# Patient Record
Sex: MALE | Race: OTHER | HISPANIC OR LATINO | URBAN - METROPOLITAN AREA
[De-identification: names, ages, dates, MRNs, and addresses within clinical notes are randomized per-mention and may not be internally consistent; named-entity substitution may affect disease eponyms.]

---

## 2019-11-05 ENCOUNTER — EMERGENCY (EMERGENCY)
Facility: HOSPITAL | Age: 34
LOS: 1 days | Discharge: HOME | End: 2019-11-05
Admitting: EMERGENCY MEDICINE
Payer: SELF-PAY

## 2019-11-05 VITALS
WEIGHT: 184.97 LBS | TEMPERATURE: 99 F | OXYGEN SATURATION: 98 % | HEART RATE: 71 BPM | HEIGHT: 62 IN | SYSTOLIC BLOOD PRESSURE: 132 MMHG | DIASTOLIC BLOOD PRESSURE: 91 MMHG | RESPIRATION RATE: 16 BRPM

## 2019-11-05 PROCEDURE — 99283 EMERGENCY DEPT VISIT LOW MDM: CPT

## 2019-11-05 RX ORDER — OFLOXACIN 0.3 %
1 DROPS OPHTHALMIC (EYE)
Qty: 1 | Refills: 0
Start: 2019-11-05 | End: 2019-11-11

## 2019-11-05 NOTE — ED PROVIDER NOTE - EYES, MLM
B/L pupils equal, round and reactive to light. MILD ERYTHEMA WITH MILD UPPER LID SWELLING WITH EOM INTACT, NO ABSCESS NOTED

## 2019-11-05 NOTE — ED PROVIDER NOTE - NSFOLLOWUPCLINICS_GEN_ALL_ED_FT
[facility]  Ophthalmology  [address] [place] [address]  Phone: [phone]  Fax:   Follow Up Time: 1-3 Days

## 2019-11-05 NOTE — ED ADULT NURSE NOTE - OBJECTIVE STATEMENT
Pt presents to ED c/o R eye burning, redness, and itching since this morning, pt reports he woke up with crusting around eye. No fevers/chills, no blurry vision, no throat pain. Pt presents in NAD speaking full sentences ambulatory through triage.

## 2019-11-05 NOTE — ED PROVIDER NOTE - OBJECTIVE STATEMENT
33 y/o male with no PMHx is here with right eye pruritis x1 day. He describes a slight heavy sensation with yellowish discharge and irritation with mild discomfort to the right eye. He denies the following: fever, chills, blurred vision, double vision, HA, dizziness, confusion and no recent illness or sick contacts/travel.

## 2019-11-05 NOTE — ED PROVIDER NOTE - PATIENT PORTAL LINK FT
You can access the FollowMyHealth Patient Portal offered by [facility] by registering at the following website: [email or web address]. By joining [employer]’s FollowMyHealth portal, you will also be able to view your health information using other applications (apps) compatible with our system.

## 2019-11-05 NOTE — ED PROVIDER NOTE - CLINICAL SUMMARY MEDICAL DECISION MAKING FREE TEXT BOX
35 y/o male with mild erythema, discharge and itching to the right eye without visual disturbances. No injury/allergies/fb sensation. Likely bacterial conjunctivitis and not chemical or viral. Do not suspect uveitis, no hyphema noted. Advised opthalmology follow up in 1-3 days.

## 2019-11-05 NOTE — ED PROVIDER NOTE - NSFOLLOWUPINSTRUCTIONS_ED_ALL_ED_FT
Conjuntivitis bacteriana en los adultos  Bacterial Conjunctivitis, Adult  La conjuntivitis bacteriana es marty infección de la membrana transparente que cubre la parte shannan del nery y la tasha interna del párpado (conjuntiva). Cuando los vasos sanguíneos de la conjuntiva se inflaman, el nery se pone rodriguez o gladis, y es posible que le pique. La conjuntivitis bacteriana se transmite fácilmente de marty persona a la otra (es contagiosa). También se contagia fácilmente de un nery al otro.  ¿Cuáles son las causas?  Esta afección está causada por bacterias. Puede contraer la infección si entra en contacto estrecho con:  Marty persona que está infectada por la bacteria.Elementos que están contaminados con la bacteria, alber marty toalla para la tasha, solución para lentes de contacto o maquillaje para ojos.¿Qué incrementa el riesgo?  Es más probable que tenga esta afección si:  Mantiene contacto físico con personas que tienen la infección.Usa lentes de contacto.Tiene sinusitis.Ha tenido marty lesión o cirugía reciente en el nery.Tiene debilitado el sistema de defensa del organismo (sistema inmunitario).Tiene marty afección médica que causa sequedad en los ojos.¿Cuáles son los signos o los síntomas?  ImageLos síntomas de esta afección incluyen:  Secreción espesa y amarillenta del nery. Esta secreción puede convertirse en marty costra en el párpado brigid la noche, que hace que los párpados se peguen.Lagrimeo u ojos llorosos.Picazón en los ojos.Sensación de ardor en los ojos.Ojos rojos.Hinchazón de los párpados.Visión borrosa.¿Cómo se diagnostica?  Esta afección se diagnostica en función de los síntomas y los antecedentes médicos. El médico también puede obtener marty muestra de la secreción del nery para averiguar la causa de la infección. [place] no se hace con frecuencia.  ¿Cómo se trata?  ImageEl tratamiento de esta afección puede incluir:  Gotas o ungüento para los ojos con antibiótico para erradicar la infección con más rapidez y evitar el contagio a otras personas.Antibióticos por vía oral para tratar infecciones que no responden a las gotas o los ungüentos, o que diggs más de 10 días.Paños húmedos fríos (compresas frías) sobre los ojos.Lágrimas artificiales aplicadas 2 a 6 veces por día.Siga estas indicaciones en ramos casa:  Medicamentos     [place] los antibióticos o aplíqueselos alber se lo haya indicado el médico. No deje de alexis los antibióticos o de aplicárselos aunque comience a sentirse mejor.[place] o aplíquese los medicamentos de venta luis daniel y los recetados solamente alber se lo haya indicado el médico.Tenga mucho cuidado de no tocar el borde del párpado con el frasco de las gotas para los ojos o el tubo del ungüento cuando aplica los medicamentos en el nery afectado. [place] evitará que se contagie la infección al otro nery o a otras personas.Control de las molestias     [place] suavemente la secreción de los ojos con un paño tibio y húmedo o con marty torunda de algodón.Aplíquese marty compresa fría y limpia en el nery brigid 10 a 20 minutos, 3 o 4 veces al día.Indicaciones generales     No use lentes de contacto hasta que haya desaparecido la inflamación y ramos médico le indique que es seguro usarlos nuevamente. Pregúntele al médico cómo esterilizar o reemplazar mary ann lentes de contacto antes de usarlos nuevamente. Use anteojos hasta que pueda volver a usar los lentes de contacto.Evite usar maquillaje en los ojos hasta que la inflamación se haya coleman. Descarte cosméticos viejos para los ojos que puedan estar contaminados.Cambie o lave ramos almohada todos los emir.No comparta las toallas o los paños. [place] puede propagar la infección.Lávese las lawrence frecuentemente con agua y jabón. Use toallas de papel para secarse las lawrence.Evite tocarse o frotarse los ojos.No conduzca ni use maquinaria pesada si ramos visión es borrosa.Comuníquese con un médico si:  Tiene fiebre.Los síntomas no mejoran después de 10 días de tratamiento.Solicite ayuda inmediatamente si tiene:  Fiebre y los síntomas empeoran repentinamente.Dolor intenso cuando mueve el nery.Dolor, enrojecimiento o hinchazón en la tasha.Pérdida repentina de la visión.Resumen  La conjuntivitis bacteriana es marty infección de la membrana transparente que cubre la parte shannan del nery y la tasha interna del párpado (conjuntiva).La conjuntivitis bacteriana se transmite fácilmente de marty persona a la otra (es contagiosa).Lávese las lawrence frecuentemente con agua y jabón. Use toallas de papel para secarse las lawrence.[place] los antibióticos o aplíqueselos alber se lo haya indicado el médico. No deje de alexis los antibióticos o de aplicárselos aunque comience a sentirse mejor.Comuníquese con un médico si tiene fiebre o si los síntomas no mejoran después de 10 días.Esta información no tiene alber fin reemplazar el consejo del médico. Asegúrese de hacerle al médico cualquier pregunta que tenga.    Document Released: 09/27/2006 Document Revised: 08/21/2019 Document Reviewed: 08/21/2019  Elsevier Interactive Patient Education © 2019 Elsevier Inc.

## 2019-11-09 DIAGNOSIS — H10.9 UNSPECIFIED CONJUNCTIVITIS: ICD-10-CM

## 2019-11-09 DIAGNOSIS — H57.11 OCULAR PAIN, RIGHT EYE: ICD-10-CM

## 2019-12-17 NOTE — ED PROVIDER NOTE - PRINCIPAL DIAGNOSIS
Detail Level: Simple Render Note In Bullet Format When Appropriate: No Post-Care Instructions: I reviewed with the patient in detail post-care instructions. Patient is to wear sunprotection, and avoid picking at any of the treated lesions. Pt may apply Vaseline to crusted or scabbing areas. Number Of Freeze-Thaw Cycles: 1 freeze-thaw cycle Duration Of Freeze Thaw-Cycle (Seconds): 10 Render Post-Care Instructions In Note?: yes Consent: The patient's verbal consent was obtained including but not limited to risks of crusting, scabbing, blistering, scarring, darker or lighter pigmentary change, recurrence, incomplete removal and infection. Conjunctivitis

## 2020-03-25 ENCOUNTER — EMERGENCY (EMERGENCY)
Facility: HOSPITAL | Age: 35
LOS: 1 days | Discharge: HOME | End: 2020-03-25
Admitting: EMERGENCY MEDICINE
Payer: MEDICAID

## 2020-03-25 VITALS
OXYGEN SATURATION: 96 % | RESPIRATION RATE: 18 BRPM | HEART RATE: 104 BPM | SYSTOLIC BLOOD PRESSURE: 152 MMHG | DIASTOLIC BLOOD PRESSURE: 75 MMHG

## 2020-03-25 VITALS — TEMPERATURE: 101 F

## 2020-03-25 DIAGNOSIS — R50.9 FEVER, UNSPECIFIED: ICD-10-CM

## 2020-03-25 DIAGNOSIS — B34.9 VIRAL INFECTION, UNSPECIFIED: ICD-10-CM

## 2020-03-25 PROCEDURE — 87635 SARS-COV-2 COVID-19 AMP PRB: CPT

## 2020-03-25 PROCEDURE — 99283 EMERGENCY DEPT VISIT LOW MDM: CPT

## 2020-03-25 PROCEDURE — 99284 EMERGENCY DEPT VISIT MOD MDM: CPT

## 2020-03-25 RX ORDER — ACETAMINOPHEN 500 MG
975 TABLET ORAL ONCE
Refills: 0 | Status: COMPLETED | OUTPATIENT
Start: 2020-03-25 | End: 2020-03-25

## 2020-03-25 RX ADMIN — Medication 975 MILLIGRAM(S): at 16:25

## 2020-03-25 NOTE — ED PROVIDER NOTE - NSFOLLOWUPINSTRUCTIONS_ED_ALL_ED_FT
Your Respiratory Viral Panel is pending which tests for other viruses. You have been tested for COVID-19. The results will take up to 1 week to come back. You has been given education on Novel Coronavirus and testing education. Please self-quarantine for 14 days and follow strict guidelines. Please return to the ED for any concerning or worsening of your symptoms including shortness of breath or chest pain.    Síndrome viral    LO QUE NECESITA SABER:    El síndrome viral es un término usado para los síntomas de marty infección causada por un virus. Los virus son propagados fácilmente de marty persona a otra a través del aire y mediante los objetos que se comparten. Marty enfermedad provocada por un virus por lo general desaparece en 10 a 14 días sin tratamiento. Para marty infección viral, no se administran antibióticos.    INSTRUCCIONES SOBRE EL [facility]A HOSPITALARIA:    Llame al 911 en marni de presentar lo siguiente:    Usted sufre marty convulsión.      No es posible despertarlo.      Usted tiene dolor torácico y dificultad para respirar.    Regrese a la rosibel de emergencias si:    Usted tiene rigidez en el cornelius, dolor de any intenso y sensibilidad a la darwin.      Usted se siente débil, mareado o confundido.      Usted selina de orinar u orina menos de lo normal.      Usted tose diaz o marty mucosidad espesa amarilla o raeann.      Usted tiene dolor abdominal severo o ramos abdomen está más mukund de lo habitual.    Comuníquese con ramos médico si:    Maribel síntomas no mejoran con el tratamiento o empeoran después de 3 días.      Usted tiene salpullido o dolor de oído.      Usted siente ardor al orinar.      Usted tiene preguntas o inquietudes acerca de ramos condición o cuidado.    Medicamentos:Es posible que usted necesite alguno de los siguientes:     Acetaminofénalivia el dolor y baja la fiebre. Está disponible sin receta médica. Pregunte cuánto medicamento debe alexis y con qué frecuencia. Siga las indicaciones. El acetaminofén puede causar daño en el hígado cuando no se scott de forma correcta.      Los SARAH,alber el ibuprofeno, ayudan a disminuir la inflamación, el dolor y la fiebre. Los SARAH pueden causar sangrado estomacal o problemas renales en ciertas personas. Si usted scott un medicamento anticoagulante, siempre pregúntele a ramos médico si los SARAH son seguros para usted. Siempre jason la etiqueta de tomas medicamento y siga las instrucciones.      El medicamento para el resfriadoayuda a disminuir la inflamación, a controlar la tos y a aliviar la congestión del pecho o nasal.      El rociador nasal de agua salinaayuda a disminuir la congestión nasal.      [place] maribel medicamentos alber se le haya indicado.Consulte con ramos médico si usted lukas que ramos medicamento no le está ayudando o si presenta efectos secundarios. Infórmele si es alérgico a algún medicamento. Mantenga marty lista actualizada de los medicamentos, las vitaminas y los productos herbales que scott. Incluya los siguientes datos de los medicamentos: cantidad, frecuencia y motivo de administración. Traiga con usted la lista o los envases de las píldoras a maribel citas de seguimiento. Lleve la lista de los medicamentos con usted en marni de marty emergencia.    El manejo de maribel síntomas:    Consuma líquidos según le indicaronpara evitar la deshidratación. Pregunte cuánto líquido debe alexis cada día y cuáles líquidos son los más adecuados para usted. Pregunte si usted debería alexis marty solución de rehidratación oral (SRO). Marty SRO tiene las cantidades exactas de agua, sal y azúcar que usted necesita para reemplazar los líquidos corporales. [place] podría ayudarlo a evitar la deshidratación a causa del vómito y de la diarrea. No tome líquidos con cafeína. Los líquidos con cafeína pueden empeorar la deshidratación.      Descanse lo suficientepara ayudar a que ramos cuerpo sane. [place] siestas brigid el día. Pregunte a ramos médico cuándo puede regresar a ramos trabajo y a maribel actividades cotidianas.      Use un humidificador de krish fríopara ayudarlo a respirar más fácilmente si usted tiene congestión nasal o del pecho. Pregunte a ramos médico cómo usar un humidificador de vapor frío.      Coma miel o use caramelos para la tospara ayudar a disminuir la molestia de la garganta. Pregunte a ramos médico cuánta miel debería comer cada día. Los caramelos para la tos están disponibles sin receta médica. Siga las indicaciones para alexis los caramelos para la tos.      No fume y permanezca lejos de otras personas que fuman.La nicotina y otras sustancias químicas que contienen los cigarrillos y cigarros pueden dañar los pulmones. El fumar también puede retrasar la sanación. Pida información a ramos médico si usted actualmente fuma y necesita ayuda para dejar de fumar. Los cigarrillos electrónicos o el tabaco sin humo igualmente contienen nicotina. Consulte con ramos médico antes de utilizar estos productos.      Lávese las lawrence frecuentementepara evitar la propagación de gérmenes a otras personas. Utilice agua y jabón. Use gel antibacterial cuando no tenga jabón ni agua disponibles. Lávese las lawrence después de usar el baño, toser o estornudar. Lávese las lawrence antes de comer o preparar alimentos.    Acuda a maribel consultas de control con ramos médico según le indicaron.[place] maribel preguntas para que se acuerde de hacerlas brigid maribel visitas.

## 2020-03-25 NOTE — ED PROVIDER NOTE - OBJECTIVE STATEMENT
35 y/o M with no PMHx presents to the ED c/o fever, chills, and body aches since yesterday; denies cough, chest pain SOB, nausea, vomiting, diarrhea sore throat, or runny nose. No recent travel, no sick contacts, and no direct contact with + COVID-19 cases. 35 y/o M with no PMHx presents to the ED c/o fever, chills, headache and body aches since yesterday; denies cough, chest pain SOB, nausea, vomiting, diarrhea sore throat, or runny nose. No recent travel, no sick contacts, and no direct contact with + COVID-19 cases. 35 y/o M with no PMHx presents to the ED c/o fever, chills, headache and body aches since yesterday; denies cough, chest pain, SOB, nausea, vomiting, diarrhea, sore throat, or runny nose. No recent travel, no sick contacts, and no direct contact with + COVID-19 cases.

## 2020-03-25 NOTE — ED PROVIDER NOTE - PATIENT PORTAL LINK FT
You can access the FollowMyHealth Patient Portal offered by [facility] by registering at the following website: [email or web address]. By joining [employer]’s FollowMyHealth portal, you will also be able to view your health information using other applications (apps) compatible with our system.

## 2020-03-25 NOTE — ED PROVIDER NOTE - PHYSICAL EXAMINATION
CONSTITUTIONAL: Well-appearing; well-nourished; in no apparent distress.   HEAD: Normocephalic; atraumatic.   EYES: PERRL; EOM intact; conjunctiva and sclera clear  ENT: normal nose; external ears normal  NECK: Supple;   CARDIOVASCULAR: Normal S1, S2;  Regular rate and rhythm.   RESPIRATORY: Breathing easily; breath sounds clear and equal bilaterally; no wheezes, rhonchi, or rales.  GI: soft, nt, nd   MSK: FROM at all extremities  SKIN: Normal for age and race  NEURO: A & O x 3  PSYCHOLOGICAL: The patient’s mood and manner are appropriate.

## 2020-03-25 NOTE — ED PROVIDER NOTE - CLINICAL SUMMARY MEDICAL DECISION MAKING FREE TEXT BOX
33 y/o M with no PMHx presents to the ED c/o fever, chills, headache and body aches since yesterday. In the ER, pt with temp 100.9F, , O2Sat at 96% on RA. Pt well appearing, lungs clear. Will test for COVID-19, advised 14 days of isolation and strict return precautions given. 35 y/o M with no PMHx presents to the ED c/o fever, chills, headache and body aches since yesterday. In the ER, pt with temp 100.9F, , O2Sat at 96% on RA. Pt well appearing, lungs clear. Will test for COVID-19, advised 14 days of isolation and strict return precautions discussed.

## 2020-03-25 NOTE — ED PROVIDER NOTE - NS ED ROS FT
Constitutional: + fever, + chills  MSK: + body aches  Neuro: + headache     All other ROS neg except as per HPI

## 2020-03-25 NOTE — ED ADULT NURSE NOTE - NSIMPLEMENTINTERV_GEN_ALL_ED
Implemented All Universal Safety Interventions:  [place] to call system. Call bell, personal items and telephone within reach. Instruct patient to call for assistance. Room bathroom lighting operational. Non-slip footwear when patient is off stretcher. Physically safe environment: no spills, clutter or unnecessary equipment. Stretcher in lowest position, wheels locked, appropriate side rails in place.

## 2020-03-27 LAB — SARS-COV-2 RNA SPEC QL NAA+PROBE: SIGNIFICANT CHANGE UP

## 2021-06-17 NOTE — ED PROVIDER NOTE - MDM ORDERS SUBMITTED SELECTION
Phone Call:    D) Pt. called this date and left message re: missed group session on 4/27/21 reason illness. Call back was made this date, Pt.  explained she is sick, reporting her son was exposed to COVID-19 at school and now they are both at home ill. She reports plans to get tested today and to return to group sessions on 5/4/21 to complete the program.    Staff Name and Title:   MONIQUE Levin  Date:  4/28/2021  Time:  10:20 AM     Labs/Medications

## 2021-07-14 NOTE — ED ADULT NURSE NOTE - NSFALLRSKASSESSTYPE_ED_ALL_ED
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

## 2021-08-02 ENCOUNTER — EMERGENCY (EMERGENCY)
Facility: HOSPITAL | Age: 36
LOS: 1 days | Discharge: HOME | End: 2021-08-02
Attending: EMERGENCY MEDICINE | Admitting: EMERGENCY MEDICINE
Payer: MEDICAID

## 2021-08-02 VITALS
HEART RATE: 75 BPM | OXYGEN SATURATION: 95 % | RESPIRATION RATE: 18 BRPM | SYSTOLIC BLOOD PRESSURE: 159 MMHG | DIASTOLIC BLOOD PRESSURE: 90 MMHG | TEMPERATURE: 99 F | HEIGHT: 62 IN

## 2021-08-02 DIAGNOSIS — R22.0 LOCALIZED SWELLING, MASS AND LUMP, HEAD: ICD-10-CM
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
## 2021-08-02 DIAGNOSIS — L29.9 PRURITUS, UNSPECIFIED: ICD-10-CM

## 2021-08-02 DIAGNOSIS — T78.49XA OTHER ALLERGY, INITIAL ENCOUNTER: ICD-10-CM

## 2021-08-02 PROCEDURE — 99284 EMERGENCY DEPT VISIT MOD MDM: CPT

## 2021-08-02 PROCEDURE — 96375 TX/PRO/DX INJ NEW DRUG ADDON: CPT

## 2021-08-02 PROCEDURE — 96374 THER/PROPH/DIAG INJ IV PUSH: CPT

## 2021-08-02 PROCEDURE — 99284 EMERGENCY DEPT VISIT MOD MDM: CPT | Mod: 25

## 2021-08-02 RX ORDER — DIPHENHYDRAMINE HCL 50 MG
25 CAPSULE ORAL ONCE
Refills: 0 | Status: COMPLETED | OUTPATIENT
Start: 2021-08-02 | End: 2021-08-02

## 2021-08-02 RX ORDER — FAMOTIDINE 10 MG/ML
20 INJECTION INTRAVENOUS ONCE
Refills: 0 | Status: COMPLETED | OUTPATIENT
Start: 2021-08-02 | End: 2021-08-02

## 2021-08-02 RX ORDER — DIPHENHYDRAMINE HCL 50 MG
1 CAPSULE ORAL
Qty: 20 | Refills: 0
Start: 2021-08-02 | End: 2021-08-06

## 2021-08-02 RX ADMIN — Medication 25 MILLIGRAM(S): at 22:42

## 2021-08-02 RX ADMIN — Medication 125 MILLIGRAM(S): at 22:42

## 2021-08-02 RX ADMIN — FAMOTIDINE 20 MILLIGRAM(S): 10 INJECTION INTRAVENOUS at 22:42

## 2021-08-02 NOTE — ED PROVIDER NOTE - NSFOLLOWUPINSTRUCTIONS_ED_ALL_ED_FT
Can take tylenol or motrin every 6hrs as needed for mild pain.  Can take benadryl 25-50mg every 6hrs as needed for itching (caution: may cause lightheaded!).  Take prednisone as prescribed.     Allergic Reaction    An allergic reaction is an abnormal reaction to a substance (allergen) by the body's defense system. Common allergens include medicines, food, insect bites or stings, and blood products. The body releases certain proteins into the blood that can cause a variety of symptoms such as an itchy rash, wheezing, swelling of the face/lips/tongue/throat, abdominal pain, nausea or vomiting. An allergic reaction is usually treated with medication. If your health care provider prescribed you an epinephrine injection device, make sure to keep it with you at all times.    SEEK IMMEDIATE MEDICAL CARE IF YOU HAVE ANY OF THE FOLLOWING SYMPTOMS: allergic reaction severe enough that required you to use epinephrine, tightness in your chest, swelling around your lips/tongue/throat, abdominal pain, vomiting or diarrhea, or lightheadedness/dizziness. These symptoms may represent a serious problem that is an emergency. Do not wait to see if the symptoms will go away. Use your auto-injector pen or anaphylaxis kit as you have been instructed. Call 911 and do not drive yourself to the hospital.

## 2021-08-02 NOTE — ED PROVIDER NOTE - OBJECTIVE STATEMENT
37 y/o M pt with no pertinent PMHx and PSHx presents to ED c/o 2 days of itching and swelling to lips and throat. Pt states on Saturday night, around 8PM, he felt tickling in his lips and throat, and felt like they became a bit swollen. He denies difficulty breathing or swallowing, rash, skin changes, fever, chills, throat pain, ear ache, cough, shortness of breath, or any other acute complaints. Pt has no exposure to new meds, foods, or use of new detergents. He has not taken anything for his symptoms. Pt is not vaccinated for covid.

## 2021-08-02 NOTE — ED PROVIDER NOTE - CLINICAL SUMMARY MEDICAL DECISION MAKING FREE TEXT BOX
37 y/o M pt with no pertinent PMHx and PSHx presents to ED c/o 2 days of itching and swelling to lips and throat.  on exam pt well appearing, no rash, mmm, no angioedema, no soft palate edema, uvula midline, no tonsillar edema, erythema, or exudates, no trismus. Neck with no stridor or lymphadenopathy. lungs ctab, no wheezing.  no obvious sxs of allergic rxn but given steroids/benadryl with significant improvement.  will dc with benadryl/prednisone and f/u with pmd.  discussed strict return parameters

## 2021-08-02 NOTE — ED ADULT NURSE NOTE - OBJECTIVE STATEMENT
Pt is a 37 y/o male A&Ox4 in NAD ambulatory with steady gait c/o throat and lip itchiness since Saturday. Pt endorses nausea. Pt denies any known allergen, dysphagia, difficulty breathing, rash. Pt talking in clear, full sentences, respirations even and unlabored, no drooling noted, airway patent.

## 2021-08-02 NOTE — ED PROVIDER NOTE - PATIENT PORTAL LINK FT
You can access the FollowMyHealth Patient Portal offered by [facility] by registering at the following website: [email or web address]. By joining [employer]’s FollowMyHealth portal, you will also be able to view your health information using other applications (apps) compatible with our system.

## 2021-08-02 NOTE — ED ADULT TRIAGE NOTE - CHIEF COMPLAINT QUOTE
"itching to lips and throat" since this morning. Denies allergies, SOB, CP, difficulty swallowing. No drooling or swelling noted to lips/tongue.

## 2021-08-02 NOTE — ED PROVIDER NOTE - PHYSICAL EXAMINATION
Vitals reviewed  Gen: well appearing, nad, speaking in full sentences  Skin: wwp, no rash/lesions  HEENT: ncat, eomi, mmm, no angioedema, no soft palette edema, uvula midline, no tonsillar edema, erythema, or exudates, no trismus. Neck with no stridor or lymphadenopathy.   CV: rrr, no audible m/r/g  Resp: symmetrical expansion, ctab, no w/r/r  Abd: nondistended, soft/nt  Ext: FROM throughout, no peripheral edema  Neuro: alert/oriented, no focal deficits, steady gait Vitals reviewed  Gen: well appearing, nad, speaking in full sentences- no dypsnea or hypoxia   Skin: wwp, no rash/lesions  HEENT: ncat, eomi, mmm, no angioedema, no soft palate edema, uvula midline, no tonsillar edema, erythema, or exudates, no trismus. Neck with no stridor or lymphadenopathy.   CV: rrr, no audible m/r/g  Resp: symmetrical expansion, ctab, no w/r/r  Ext: FROM throughout, no peripheral edema  Neuro: alert/oriented, no focal deficits, steady gait

## 2021-08-02 NOTE — ED ADULT NURSE NOTE - NSIMPLEMENTINTERV_GEN_ALL_ED
Rx not sent. Therapy was completed in 12/2017. Implemented All Universal Safety Interventions:  [place] to call system. Call bell, personal items and telephone within reach. Instruct patient to call for assistance. Room bathroom lighting operational. Non-slip footwear when patient is off stretcher. Physically safe environment: no spills, clutter or unnecessary equipment. Stretcher in lowest position, wheels locked, appropriate side rails in place.

## 2021-08-03 PROBLEM — Z78.9 OTHER SPECIFIED HEALTH STATUS: Chronic | Status: ACTIVE | Noted: 2020-03-25

## 2022-01-28 ENCOUNTER — EMERGENCY (EMERGENCY)
Facility: HOSPITAL | Age: 37
LOS: 1 days | Discharge: HOME | End: 2022-01-28
Attending: EMERGENCY MEDICINE | Admitting: EMERGENCY MEDICINE
Payer: MEDICAID

## 2022-01-28 VITALS
RESPIRATION RATE: 18 BRPM | DIASTOLIC BLOOD PRESSURE: 93 MMHG | HEIGHT: 62 IN | OXYGEN SATURATION: 95 % | HEART RATE: 78 BPM | SYSTOLIC BLOOD PRESSURE: 145 MMHG | TEMPERATURE: 98 F

## 2022-01-28 VITALS
RESPIRATION RATE: 17 BRPM | DIASTOLIC BLOOD PRESSURE: 87 MMHG | HEART RATE: 74 BPM | SYSTOLIC BLOOD PRESSURE: 143 MMHG | TEMPERATURE: 98 F | OXYGEN SATURATION: 96 %

## 2022-01-28 DIAGNOSIS — L29.9 PRURITUS, UNSPECIFIED: ICD-10-CM

## 2022-01-28 DIAGNOSIS — Z20.822 CONTACT WITH AND (SUSPECTED) EXPOSURE TO COVID-19: ICD-10-CM

## 2022-01-28 DIAGNOSIS — R07.2 PRECORDIAL PAIN: ICD-10-CM

## 2022-01-28 DIAGNOSIS — R07.89 OTHER CHEST PAIN: ICD-10-CM

## 2022-01-28 LAB
ALBUMIN SERPL ELPH-MCNC: 4.9 G/DL — SIGNIFICANT CHANGE UP (ref 3.3–5)
ALP SERPL-CCNC: 97 U/L — SIGNIFICANT CHANGE UP (ref 40–120)
ALT FLD-CCNC: 91 U/L — HIGH (ref 10–45)
ANION GAP SERPL CALC-SCNC: 14 MMOL/L — SIGNIFICANT CHANGE UP (ref 5–17)
AST SERPL-CCNC: 49 U/L — HIGH (ref 10–40)
BASOPHILS # BLD AUTO: 0.05 K/UL — SIGNIFICANT CHANGE UP (ref 0–0.2)
BASOPHILS NFR BLD AUTO: 0.5 % — SIGNIFICANT CHANGE UP (ref 0–2)
BILIRUB SERPL-MCNC: 0.3 MG/DL — SIGNIFICANT CHANGE UP (ref 0.2–1.2)
BUN SERPL-MCNC: 14 MG/DL — SIGNIFICANT CHANGE UP (ref 7–23)
CALCIUM SERPL-MCNC: 9.7 MG/DL — SIGNIFICANT CHANGE UP (ref 8.4–10.5)
CHLORIDE SERPL-SCNC: 104 MMOL/L — SIGNIFICANT CHANGE UP (ref 96–108)
CO2 SERPL-SCNC: 23 MMOL/L — SIGNIFICANT CHANGE UP (ref 22–31)
CREAT SERPL-MCNC: 0.78 MG/DL — SIGNIFICANT CHANGE UP (ref 0.5–1.3)
D DIMER BLD IA.RAPID-MCNC: <150 NG/ML DDU — SIGNIFICANT CHANGE UP
EOSINOPHIL # BLD AUTO: 0.17 K/UL — SIGNIFICANT CHANGE UP (ref 0–0.5)
EOSINOPHIL NFR BLD AUTO: 1.7 % — SIGNIFICANT CHANGE UP (ref 0–6)
GLUCOSE SERPL-MCNC: 95 MG/DL — SIGNIFICANT CHANGE UP (ref 70–99)
HCT VFR BLD CALC: 49.3 % — SIGNIFICANT CHANGE UP (ref 39–50)
HGB BLD-MCNC: 17.1 G/DL — HIGH (ref 13–17)
IMM GRANULOCYTES NFR BLD AUTO: 0.2 % — SIGNIFICANT CHANGE UP (ref 0–1.5)
LYMPHOCYTES # BLD AUTO: 3.78 K/UL — HIGH (ref 1–3.3)
LYMPHOCYTES # BLD AUTO: 38.4 % — SIGNIFICANT CHANGE UP (ref 13–44)
MAGNESIUM SERPL-MCNC: 2 MG/DL — SIGNIFICANT CHANGE UP (ref 1.6–2.6)
MCHC RBC-ENTMCNC: 29.6 PG — SIGNIFICANT CHANGE UP (ref 27–34)
MCHC RBC-ENTMCNC: 34.7 GM/DL — SIGNIFICANT CHANGE UP (ref 32–36)
MCV RBC AUTO: 85.4 FL — SIGNIFICANT CHANGE UP (ref 80–100)
MONOCYTES # BLD AUTO: 0.6 K/UL — SIGNIFICANT CHANGE UP (ref 0–0.9)
MONOCYTES NFR BLD AUTO: 6.1 % — SIGNIFICANT CHANGE UP (ref 2–14)
NEUTROPHILS # BLD AUTO: 5.22 K/UL — SIGNIFICANT CHANGE UP (ref 1.8–7.4)
NEUTROPHILS NFR BLD AUTO: 53.1 % — SIGNIFICANT CHANGE UP (ref 43–77)
NRBC # BLD: 0 /100 WBCS — SIGNIFICANT CHANGE UP (ref 0–0)
PLATELET # BLD AUTO: 230 K/UL — SIGNIFICANT CHANGE UP (ref 150–400)
POTASSIUM SERPL-MCNC: 3.7 MMOL/L — SIGNIFICANT CHANGE UP (ref 3.5–5.3)
POTASSIUM SERPL-SCNC: 3.7 MMOL/L — SIGNIFICANT CHANGE UP (ref 3.5–5.3)
PROT SERPL-MCNC: 7.7 G/DL — SIGNIFICANT CHANGE UP (ref 6–8.3)
RBC # BLD: 5.77 M/UL — SIGNIFICANT CHANGE UP (ref 4.2–5.8)
RBC # FLD: 12.1 % — SIGNIFICANT CHANGE UP (ref 10.3–14.5)
SARS-COV-2 RNA SPEC QL NAA+PROBE: NEGATIVE — SIGNIFICANT CHANGE UP
SODIUM SERPL-SCNC: 141 MMOL/L — SIGNIFICANT CHANGE UP (ref 135–145)
TROPONIN T SERPL-MCNC: 0.01 NG/ML — SIGNIFICANT CHANGE UP (ref 0–0.01)
WBC # BLD: 9.84 K/UL — SIGNIFICANT CHANGE UP (ref 3.8–10.5)
WBC # FLD AUTO: 9.84 K/UL — SIGNIFICANT CHANGE UP (ref 3.8–10.5)

## 2022-01-28 PROCEDURE — 99285 EMERGENCY DEPT VISIT HI MDM: CPT | Mod: 25

## 2022-01-28 PROCEDURE — 71045 X-RAY EXAM CHEST 1 VIEW: CPT | Mod: 26

## 2022-01-28 PROCEDURE — 80053 COMPREHEN METABOLIC PANEL: CPT

## 2022-01-28 PROCEDURE — 71045 X-RAY EXAM CHEST 1 VIEW: CPT

## 2022-01-28 PROCEDURE — 84484 ASSAY OF TROPONIN QUANT: CPT

## 2022-01-28 PROCEDURE — 96374 THER/PROPH/DIAG INJ IV PUSH: CPT

## 2022-01-28 PROCEDURE — 87635 SARS-COV-2 COVID-19 AMP PRB: CPT

## 2022-01-28 PROCEDURE — 85025 COMPLETE CBC W/AUTO DIFF WBC: CPT

## 2022-01-28 PROCEDURE — 36415 COLL VENOUS BLD VENIPUNCTURE: CPT

## 2022-01-28 PROCEDURE — 83735 ASSAY OF MAGNESIUM: CPT

## 2022-01-28 PROCEDURE — 93005 ELECTROCARDIOGRAM TRACING: CPT

## 2022-01-28 PROCEDURE — 93010 ELECTROCARDIOGRAM REPORT: CPT

## 2022-01-28 PROCEDURE — 85379 FIBRIN DEGRADATION QUANT: CPT

## 2022-01-28 RX ORDER — IBUPROFEN 200 MG
1 TABLET ORAL
Qty: 30 | Refills: 0
Start: 2022-01-28

## 2022-01-28 RX ORDER — KETOROLAC TROMETHAMINE 30 MG/ML
15 SYRINGE (ML) INJECTION ONCE
Refills: 0 | Status: DISCONTINUED | OUTPATIENT
Start: 2022-01-28 | End: 2022-01-28

## 2022-01-28 RX ADMIN — Medication 15 MILLIGRAM(S): at 21:10

## 2022-01-28 NOTE — ED PROVIDER NOTE - NSFOLLOWUPINSTRUCTIONS_ED_ALL_ED_FT
Consulte a ramos proveedor de atención primaria en 2-3 días. Llame para marty eliana. Si tiene algún problema con el seguimiento, llame al Coordinador de referencias de ED al [phone]. Regrese a la rosibel de emergencias si los síntomas empeoran u otras preocupaciones.     [place] ibuprofeno 600 mg cada 6 horas según sea necesario para el dolor.      Dolor de pecho    LO QUE NECESITA SABER:    El dolor en el pecho puede ser provocado por marty variedad de condiciones, algunas no tan serias y otras que son de peligro mortal. El dolor de pecho también puede ser un síntoma de un problema digestivo, alber la acidez o marty úlcera estomacal. Un ataque de ansiedad o marty emoción jag, alber el enojo, también pueden provocar dolor de pecho. Marty infección, inflamación o fractura en un hueso o cartílago en el pecho podría provocar dolor o molestia. En ocasiones el dolor torácico o la presión en el pecho pueden ser el resultado de denise circulación de la diaz al corazón (angina). El dolor de pecho también puede ser causado por trastornos potencialmente mortales alber un ataque al corazón o un coágulo de diaz en los pulmones.    INSTRUCCIONES SOBRE EL [facility] HOSPITALARIA:    Llame al número local de emergencias (911 en los [place]) o pídale a alguien que llame si:  •Tiene alguno de los siguientes signos de un ataque cardíaco: ?Estrujamiento, presión o tensión en ramos pecho      ?Usted también podría presentar alguno de los siguientes: ?Malestar o dolor en ramos espalda, cornelius, mandíbula, abdomen, o brazo      ?Falta de aliento      ?Náuseas o vómitos      ?Desvanecimiento o sudor frío repentino            Regrese a la rosibel de emergencias si:  •La inflamación en ramos pecho empeora, aun con tratamiento.      •Usted tose o vomita diaz.      •Maribel heces son negras o tienen diaz.      •Usted no puede dejar de vomitar o le duele al tragar.      Llame a ramos médico si:  •Usted tiene preguntas o inquietudes acerca de ramos condición o cuidado.          Medicamentos:  •Los medicamentospueden administrarse para tratar la causa del dolor de pecho. Por ejemplo, analgésicos, medicamentos para la ansiedad o medicamentos para aumentar el flujo de diaz al corazón.      •No tome ciertos medicamentos sin antes preguntarle a ramos médico.Estos incluyen SARAH, suplementos vitamínicos o a base de hierbas u hormonas (estrógeno o progestágeno).      •[place] maribel medicamentos alber se le haya indicado.Consulte con ramos médico si usted lukas que ramos medicamento no le está ayudando o si presenta efectos secundarios. Infórmele si es alérgico a cualquier medicamento. Mantenga marty lista actualizada de los medicamentos, las vitaminas y los productos herbales que scott. Incluya los siguientes datos de los medicamentos: cantidad, frecuencia y motivo de administración. Traiga con usted la lista o los envases de las píldoras a maribel citas de seguimiento. Lleve la lista de los medicamentos con usted en marni de marty emergencia.      Consejos para vivir saludable:Los siguientes son consejos generales de ariela. Si se conoce la causa de ramos dolor de pecho, ramos médico le dará pautas específicas a seguir.  •No fume.La nicotina y otros químicos contenidos en los cigarrillos y cigarros pueden causar daño a maribel pulmones y el corazón. Pida información a ramos médico si usted actualmente fuma y necesita ayuda para dejar de fumar. Los cigarrillos electrónicos o el tabaco sin humo igualmente contienen nicotina. Consulte con ramos médico antes de utilizar estos productos.      •Elija marty variedad de alimentos saludables tan a menudo alber sea posible.Incluya frutas y verduras frescas, congeladas o enlatadas. También incluya productos lácteos bajos en grasa, pescado, jensen (sin piel) y marbin magras. Ramos médico o dietista pueden ayudarlo a crear planes de alimentos. Es posible que tenga que evitar ciertos alimentos o bebidas si el dolor es causado por un problema de digestión.  Alimentos saludables           •Reduzca el consumo de sodio (sal).Limite el consumo de alimentos altos en sodio, alber comidas enlatadas, bocadillos salados y embutidos. Si añade andreina cuando cocina la comida, no añada más en la madden. Elija alimentos enlatados bajos en sodio tanto alber sea posible.             •Consuma abundante agua al día.El agua ayuda al cuerpo a controlar la temperatura y la presión arterial. Pregunte a ramos médico cuál es la cantidad de agua que debería consumir cada día.      •Pregunte acerca de la actividad.Ramos médico le dirá cuáles actividades limitar y cuáles evitar. Pregunte cuándo puede manejar, regresar a ramos trabajo y tener relaciones sexuales. Pida más información acerca de un plan de ejercicio adecuado para usted.      •Mantenga un peso saludable.Pregúntele a ramos médico cuál es el peso ideal para usted. [place] que lo ayude a crear un plan seguro para bajar de peso si tiene sobrepeso.      •Pregunte sobre las vacunas que pudiera necesitar.Vacúnese contra la influenza (gripe) todos los años tan pronto alber se recomiende, normalmente en septiembre u octubre. Usted también podría necesitar la vacuna antineumocócica para evitar la neumonía. La vacuna se administra generalmente cada 5 años, a partir de los 65 años. Ramos médico puede indicarle si debe recibir otras vacunas, y cuándo aplicárselas.      Programe marty eliana con ramos médico dentro de 72 horas o alber se le indique:Es posible que deba regresar para hacerse más pruebas para encontrar la causa del dolor de pecho. Es probable que lo refieran a un especialista, alber un cardiólogo o un gastroenterólogo. Anote maribel preguntas para que se acuerde de hacerlas brigid maribel visitas.

## 2022-01-28 NOTE — ED ADULT TRIAGE NOTE - ARRIVAL INFO ADDITIONAL COMMENTS
pt c/o 1 week of chest pain and 6 months of intermittent itching lips.  no swelling.  seen here in the past for the lips and was told it was an allergy.

## 2022-01-28 NOTE — ED ADULT TRIAGE NOTE - DOMESTIC TRAVEL HIGH RISK QUESTION
Advance Care Planning Conversation  First Steps®     Does individual have existing ACP documents? [x] Yes  [] No  If Yes, type of document: Advance Medical Directive  Copy of document in electronic health record? [x] Yes  [] No  If no, requested copy of document? [] Yes  [] No    Date of conversation:  1/16/17 [address] [facility]    Participants:   [x] Patient    [] Prospective agent (not yet named in a document) / Other surrogate decision  maker / next of kin    Name:  Relationship to patient:    Phone number   [x] Healthcare agent (already designated in existing ACP document)    Name: Ramon Franz    Relationship to patient:Spouse  Phone number: [phone]    Other persons present:   Name:     Relationship to patient:   Name:     Relationship to patient:    Individual's Fears/Concerns about planning:None     Goals/Narrative of Conversation: To complete honoring choices and have entered into hospital systems                   Conversation topics for Individual    Has learned the following from prior experiences with serious illness:    Identifies the following as important for living well:    \"What cultural, Buddhist, spiritual, or personal beliefs (if any) do you have that might help you choose the care you want, or do not want? \"  Patient response:        \"Would you like to talk to someone about these beliefs or concerns? \"  Patient response:     Conversation topics for Agent / Prospective Agent    Understanding of the role of healthcare agent:She knew importance of having primary and suscessor agent. Agent confirms Willingness to:   [x]Yes []No Accept role   [x] Yes []No Talk with individual about his/her goals, values, & preferences   [x] Yes [] No   Follow individual's decisions, even if do not agree with those    decisions   [x]Yes  []No Make decisions in difficult moments    Other questions/concerns about role of agent none    Topics for Chronic Illness (if applicable):       \"What do you understand about your (name of illness)? \"  Patient response:    \"What do you understand about the complications that may occur with your (name of illness)? \"  Patient response:    \"What do you understand about CPR? \" Patient response:    \"What outcome would you expect from CPR? \" Patient response:    \"What would be an unacceptable outcome? \" Patient response:        First Steps® ACP Facilitator: Paul Sanabria RN    Length (minutes): 60    The following was provided (check all that apply):   [x] Clarification of the goals of ACP    [x] Criteria for choosing a healthcare agent   [x] Written information on the planning process      Healthcare Decision Information Cards:   [x] Help with Breathing Facts   [x] Tube Feeding Facts   [x] CPR Facts      [x] Review of the completion of the advance directive    [x] Review of existing advance directive       Meeting Outcomes:   [x] ACP discussion completed   [x] Advance directive form completed   [x] Original of completed advance directive given to patient   [] Copy given to healthcare agent    [x] Copy scanned to electronic medical record    If ACP discussion not completed, last interview topic discussed:     Follow-up plan:     [] Schedule follow-up conversation to continue planning   [] Referred individual to provider for additional questions/concerns    [] Individual will invite agent/prospective agent to next ACP appointment   [] Recommended to review completed AMD annually or upon change in health status     [] This note routed to one or more involved healthcare providers No

## 2022-01-28 NOTE — ED PROVIDER NOTE - PATIENT PORTAL LINK FT
You can access the FollowMyHealth Patient Portal offered by [facility] by registering at the following website: [email or web address]. By joining [facility]’s FollowMyHealth portal, you will also be able to view your health information using other applications (apps) compatible with our system.

## 2022-01-28 NOTE — ED ADULT NURSE NOTE - NSIMPLEMENTINTERV_GEN_ALL_ED
Implemented All Universal Safety Interventions:  [place] to call system. Call bell, personal items and telephone within reach. Instruct patient to call for assistance. Room bathroom lighting operational. Non-slip footwear when patient is off stretcher. Physically safe environment: no spills, clutter or unnecessary equipment. Stretcher in lowest position, wheels locked, appropriate side rails in place.

## 2022-01-28 NOTE — ED PROVIDER NOTE - OBJECTIVE STATEMENT
here with left sided chest pain radiating to back for past week. Intermittent, currently present. No aggravating factors. No fever, chills, sob, n/v. Hasn't had before. Didn't take anything for pain. Non smoker.

## 2022-01-28 NOTE — ED ADULT NURSE NOTE - OBJECTIVE STATEMENT
Received ambulatory with steady gait with chief complaints of chest pain x 1 week with associated dyspnea on exertion. Patient as well reports sore throat and itchy lips.    Patient AOX4, speaking full sentences.  +PERRLA. Resps even and nonlabored. Moves all extremities. No edema noted in BLE, cap refill less than 3 seconds, dorsal pedal pulses are good bilaterally. No obvious trauma/injury/deformity noted. Patient oriented to ED area. All needs attended. POC reviewed. Placed on continuos cardiac monitoring. Purposeful proactive hourly rounding in progress.

## 2022-01-28 NOTE — ED ADULT NURSE NOTE - CHPI ED NUR SEVERITY2
August 12, 2021     Patient: Christian Quinteros   YOB: 1965   Date of Visit: 8/12/2021       To Whom it May Concern:    Blanche Robin is under my professional care  He was seen in my office on 8/12/2021  He  Should be excused from work from 08/10/2021 until 08/16/2021  He may return to work on 08/16/2021 without restrictions  If you have any questions or concerns, please don't hesitate to call           Sincerely,          Moses Bowie PA-C        CC: No Recipients PAIN SCALE 5 OF 10.

## 2022-01-28 NOTE — ED PROVIDER NOTE - CLINICAL SUMMARY MEDICAL DECISION MAKING FREE TEXT BOX
1 wk of intermittent left sided chest pain. labs/cxr/ecg ordered. cxr neg for pneumonia, pneumothorax, widened mediastinum to suggest dissection.  troponin neg and ecg non ischemic making acs unlikely given 1 week of symptoms. wells low risk a d dimer neg for pe. given toradol for possible pleurisy vs musculoskeletal chest pain. home with ibuprofen. f/u pmd. return precautions discussed

## 2022-08-15 NOTE — ED ADULT NURSE NOTE - NS PRO PASSIVE SMOKE EXP
Detail Level: Detailed Introduction Text (Please End With A Colon): The following procedure was deferred: Procedure To Be Performed At Next Visit: Excision Size Of Lesion In Cm (Optional): 0 Unknown

## 2024-08-24 NOTE — ED ADULT NURSE NOTE - MODE OF DISCHARGE
Problem: Discharge Planning  Goal: Discharge to home or other facility with appropriate resources  8/24/2024 0953 by Skylar Meraz RN  Outcome: Progressing  Flowsheets (Taken 8/24/2024 0953)  Discharge to home or other facility with appropriate resources:   Identify barriers to discharge with patient and caregiver   Arrange for needed discharge resources and transportation as appropriate     Problem: Pain  Goal: Verbalizes/displays adequate comfort level or baseline comfort level  8/24/2024 0953 by Skylar Meraz RN  Outcome: Progressing  Flowsheets (Taken 8/24/2024 0953)  Verbalizes/displays adequate comfort level or baseline comfort level:   Encourage patient to monitor pain and request assistance   Assess pain using appropriate pain scale   Administer analgesics based on type and severity of pain and evaluate response     Problem: ABCDS Injury Assessment  Goal: Absence of physical injury  8/24/2024 0953 by Skylar Meraz RN  Outcome: Progressing  Flowsheets (Taken 8/24/2024 0953)  Absence of Physical Injury: Implement safety measures based on patient assessment     Problem: Safety - Adult  Goal: Free from fall injury  8/24/2024 0953 by Skylar Meraz RN  Outcome: Progressing  Flowsheets (Taken 8/24/2024 0953)  Free From Fall Injury: Instruct family/caregiver on patient safety     Problem: Skin/Tissue Integrity  Goal: Absence of new skin breakdown  Description: 1.  Monitor for areas of redness and/or skin breakdown  2.  Assess vascular access sites hourly  3.  Every 4-6 hours minimum:  Change oxygen saturation probe site  4.  Every 4-6 hours:  If on nasal continuous positive airway pressure, respiratory therapy assess nares and determine need for appliance change or resting period.  8/24/2024 0953 by Skylar Meraz RN  Outcome: Progressing     Problem: Chronic Conditions and Co-morbidities  Goal: Patient's chronic conditions and co-morbidity symptoms are monitored and maintained or  improved  8/24/2024 0953 by Skylar Meraz, RN  Outcome: Progressing  Flowsheets (Taken 8/24/2024 0953)  Care Plan - Patient's Chronic Conditions and Co-Morbidity Symptoms are Monitored and Maintained or Improved: Monitor and assess patient's chronic conditions and comorbid symptoms for stability, deterioration, or improvement   Care plan reviewed with patient.  Patient verbalizes understanding of the plan of care and contributes to goal setting.    Ambulatory